# Patient Record
Sex: FEMALE | Race: WHITE | URBAN - METROPOLITAN AREA
[De-identification: names, ages, dates, MRNs, and addresses within clinical notes are randomized per-mention and may not be internally consistent; named-entity substitution may affect disease eponyms.]

---

## 2017-03-28 LAB
HPV COMMENT: NORMAL
HPV TYPE 16: NOT DETECTED
HPV TYPE 18: NOT DETECTED
HPVOH (OTHER TYPES): NOT DETECTED

## 2022-11-29 NOTE — PROGRESS NOTES
[facility]    CC: Breast CA s/p previous implant reconstruction    Referring physician: Natasha Guerra MD    HPI: This is a 47 y.o. female with a PMHx as delineated below who presents in consultation for breast reconstruction. She was found to have left breast cancer and underwent bilateral mastectomy with DTI reconstruction in 2008. She has not had any issues with her reconstruction since her surgery. She notes that she had some lymphedema that improved with compression. She notes that she has not had any changes with her implants, however was concerned about potential need for replacement of her implants. Given this, Plastic surgery was consulted for evaluation and treatment. The specifics of her breast cancer workup / treatment is as follows:     Diagnosis: invasive ductal &BRCA  Mo/Yr Diagnosed: 2008  Breast Involved:Left  Surgery: Bilateral mastectomy with direct to implant reconstruction (Aicha Brooks and Evan Cadena)  Date of Surgery: 2008  Tumor Size & rdGrdrrdarddrderd:rd rd3rd Henry status: Negative  ER: Unknown NV: Unknown HER2: Unknown    Oncologist: Dr. Emily Tadeo  Radiation: None    Chemo/Meds: Completed adjuvant chemotherapy  Pregnancy/Miscarriages: 2/1    PMHx:   Past Medical History:   Diagnosis Date    Breast cancer ([facility] [address]      PSHx:   Past Surgical History:   Procedure Laterality Date    BREAST BIOPSY      DILATION AND CURETTAGE OF UTERUS      LYMPH NODE BIOPSY      RASTA AND BSO      TUNNELED VENOUS PORT PLACEMENT       Allergies: Allergies   Allergen Reactions    Povidone     Soap      FHx: Past history of breast CA: Yes (mother, paternal mother, aunt)    Meds:   Current Outpatient Medications   Medication Sig Dispense Refill    cycloSPORINE (RESTASIS) 0.05 % ophthalmic emulsion 1 drop 2 times daily      cetirizine (ZYRTEC ALLERGY) 10 MG tablet Take 10 mg by mouth daily       No current facility-administered medications for this visit.      SocHx: Smoking: No    ETOH: occasional    Drug use: No    ROS   Constitutional: Negative for chills and fever. HENT: Negative for congestion, facial swelling, and voice change. Eyes: Negative for photophobia and visual disturbance. Respiratory: Negative for apnea, cough, chest tightness and shortness of breath. Cardiovascular: Negative for chest pain and palpitations. Gastrointestinal: Negative for dysphagia and early satiety. Genitourinary: Negative for difficulty urinating, dysuria, flank pain, frequency and hematuria. Musculoskeletal: Negative for new gait problem, joint swelling and myalgias. Skin: Negative for color change, pallor and rash. Endocrine: negative for tremors, temperature intolerance or polydipsia. Allergic/Immunologic: Negative for new environmental or food allergies. Neurological: Negative for dizziness, seizures, speech difficulty, numbness. Hematological: Negative for adenopathy. Psychiatric/Behavioral: Negative for agitation and confusion. EXAM  /77   Pulse 85   Temp 98.1 °F (36.7 °C) (Temporal)   Resp 17   Ht 5' 7\" (1.702 m)   Wt 156 lb 6.4 oz (70.9 kg)   SpO2 99%   BMI 24.50 kg/m²     GEN: NAD, pleasant, healthy  CVS: RRR  PULM: No respiratory distress  HEENT: PERRLA/EOMI; hearing appears within normal limits  NECK: Supple with trachea in midline, no masses  EXT: No lymphedema noted  ABD: soft/NT/ND   NEURO: No focal deficits, no obvious CN deficits  BACK: Bilateral latiss muscle intact  BREAST:   Physical Exam    Bra size: 36B  Desired bra size: Same size  Ptosis grade (reconstructed nipple without tattoo):   R: 1   L: 1  The left breast size is smaller than the right breast.  There were no palpable masses with no palpable lymphadenopathy in either axilla.    Nipple retraction: NA  Healed transverse mastectomies with some contour irregularities  Moderate animation deformity noted    Left breast sternal notch to nipple: 20 cm  Right breast sternal notch to nipple: 19.3 cm    Left breast nipple to inframammary fold: 5.5 cm  Right breast nipple to inframammary fold: 6.2 cm    Left breast width 15 cm  Right breast width 13 cm    IMAGING: PENDING    IMP: 47 y.o. female with breast cancer who presents for discussion regarding revision, reconstruction options  PLAN: Will obtain MRI for evaluation of her implant integrity. Will then return to discuss options including exchange within the submuscular pocket and fat grafting vs exchange to prepectoral space to eliminate her animation deformity and staged fat grafting. A discussion regarding surgical options including immediate vs delayed approaches, implant based vs autologous, as well as additional planned revisional surgeries was performed with the patient. Multiple autologous donor sites were discussed as well. Clinical photos were obtained. We additionally discussed the FDA recommendations regarding monitoring of silicone implants. The risks, benefits, alternatives, outcomes, personnel involved were discussed. Specifically, the risks including, but not limited to: bleeding that may necessitate transfusion or operation, infection, seroma, reoperation, nonhealing, poor cosmetic outcome, asymmetry, scarring, partial or total flap loss, donor site morbidity, VTE (DVT/PE), and death were discussed. All questions were answered in a satisfactory manner according to the patient.      Leila Paget, MD  [facility]  11/30/22

## 2022-11-30 ENCOUNTER — OFFICE VISIT (OUTPATIENT)
Dept: SURGERY | Age: 52
End: 2022-11-30
Payer: COMMERCIAL

## 2022-11-30 ENCOUNTER — TELEPHONE (OUTPATIENT)
Dept: SURGERY | Age: 52
End: 2022-11-30

## 2022-11-30 VITALS
WEIGHT: 156.4 LBS | HEART RATE: 85 BPM | BODY MASS INDEX: 24.55 KG/M2 | SYSTOLIC BLOOD PRESSURE: 132 MMHG | DIASTOLIC BLOOD PRESSURE: 77 MMHG | OXYGEN SATURATION: 99 % | TEMPERATURE: 98.1 F | RESPIRATION RATE: 17 BRPM | HEIGHT: 67 IN

## 2022-11-30 DIAGNOSIS — C50.912 MALIGNANT NEOPLASM OF LEFT FEMALE BREAST, UNSPECIFIED ESTROGEN RECEPTOR STATUS, UNSPECIFIED SITE OF BREAST (HCC): Primary | ICD-10-CM

## 2022-11-30 PROCEDURE — 99204 OFFICE O/P NEW MOD 45 MIN: CPT | Performed by: SURGERY

## 2022-11-30 NOTE — TELEPHONE ENCOUNTER
I lmom for the patient at the home number lsited. I advised that I will call her once I have a surgery letter and we can discuss what medical records we need if any. I will leave this phone note open.

## 2022-11-30 NOTE — TELEPHONE ENCOUNTER
Patient called to let the team know that she has been unable to track down records from her last mammogram.    She has an MRI scheduled for 12/19/2022, and that documentation is needed prior to that appointment. The patient is going to try to reach out to her OB-GYN (whom she's seen for years) to see if that record is still on file. She'll call us back with an update.

## 2022-12-01 NOTE — TELEPHONE ENCOUNTER
The patient is scheduled for an MRI 12-. I will not receive a surgery letter or be able to move forward with insurance pre certification until we receive the results. I will close this phone note.

## 2022-12-19 ENCOUNTER — HOSPITAL ENCOUNTER (OUTPATIENT)
Dept: MRI IMAGING | Age: 52
Discharge: HOME | End: 2022-12-19
Payer: COMMERCIAL

## 2022-12-19 DIAGNOSIS — C50.912 MALIGNANT NEOPLASM OF LEFT FEMALE BREAST, UNSPECIFIED ESTROGEN RECEPTOR STATUS, UNSPECIFIED SITE OF BREAST (HCC): ICD-10-CM

## 2022-12-19 PROCEDURE — 77047 MRI BREAST C- BILATERAL: CPT

## 2022-12-19 RX ORDER — SODIUM CHLORIDE 9 MG/ML
INJECTION, SOLUTION INTRAVENOUS ONCE
Status: DISCONTINUED | OUTPATIENT
Start: 2022-12-19 | End: 2022-12-20 | Stop reason: HOSPADM

## 2022-12-20 ENCOUNTER — TELEPHONE (OUTPATIENT)
Dept: SURGERY | Age: 52
End: 2022-12-20

## 2022-12-20 NOTE — TELEPHONE ENCOUNTER
----- Message from Darell Addison MD sent at 12/19/2022  8:56 PM EST -----  Limited exam, but no overt evidence of rupture. Will see her back in the office to discuss how she would like to proceed (eg transition to prepec or just exchange within the subpec space). Thanks!   NK

## 2022-12-20 NOTE — TELEPHONE ENCOUNTER
Called and left detailed message for patient. Requested patient call office and schedule a follow up with MD to discuss how she desires to move forward.

## 2023-01-09 ENCOUNTER — OFFICE VISIT (OUTPATIENT)
Dept: SURGERY | Age: 53
End: 2023-01-09
Payer: COMMERCIAL

## 2023-01-09 VITALS
HEIGHT: 67 IN | OXYGEN SATURATION: 99 % | WEIGHT: 160 LBS | SYSTOLIC BLOOD PRESSURE: 139 MMHG | TEMPERATURE: 97.2 F | HEART RATE: 86 BPM | BODY MASS INDEX: 25.11 KG/M2 | DIASTOLIC BLOOD PRESSURE: 80 MMHG

## 2023-01-09 DIAGNOSIS — C50.912 MALIGNANT NEOPLASM OF LEFT FEMALE BREAST, UNSPECIFIED ESTROGEN RECEPTOR STATUS, UNSPECIFIED SITE OF BREAST (HCC): Primary | ICD-10-CM

## 2023-01-09 PROCEDURE — 99214 OFFICE O/P EST MOD 30 MIN: CPT | Performed by: SURGERY

## 2023-01-09 NOTE — LETTER
Surgery Schedule Request Form  [facility]  [address] [place] [address]. [address]  DATE OF SURGERY: ***      TIME OF SURGERY: ***     Confirmation#:__________________        Surgeon Name: Jovana Barber MD    Phone: [phone]     Fax: [phone]  Procedure Name: 1) Exchange for permanent implants bilateral breasts             2) Bilateral breast capsulectomies     3) Bilateral breast fat grafting  CPT CODES (required for scheduling): [phone], 28747, 67 [phone] 65 [phone], [phone]  DIAGNOSIS:   Breast CA s/p implant reconstruction    LENGTH OF PROCEDURE: 2.5 hours  Patient Status: Outpatient    PATIENT NAME: Xiomara Zavala            YOB: 1970  SEX: female   SS #:   PHONE: [phone] (home) [phone] (work)    Labs Needed:   CBC ___  PT/PTT___ INR ____  CMP ___ EKG ___   Urine Hcg ___            Pre-Op to be done by: PCP  Cardiac Clearance Done by: per PCP    Pt Position:  supine  Patient to meet with Anesthesiology prior to surgery: no     Medications to be stopped 5 days before surgery: anticoagulation     **Ancef 2 gm IV OCTOR (NOTE:  If patient weight is > 120 kg, Administer 3 gm)  Other Orders: Transexemic acid 1g IV OCTOR; No Decadron; SA    INSURANCE: ***                                              SUBSCRIBER NAME: ***  MEMBER ID: ***                                               AUTHORIZATION #: ***    PCP: ***                                       ANESTHESIA:  General    Jovana Barber MD                             FAX TO: [phone]   QUESTIONS?  CALL: [phone]

## 2023-01-09 NOTE — PROGRESS NOTES
[facility]    CC: Breast CA s/p previous implant reconstruction    Referring physician: Juanita Goode MD    HPI: This is a 47 y.o. female with a PMHx as delineated below who presents in consultation for breast reconstruction. She was found to have left breast cancer and underwent bilateral mastectomy with DTI reconstruction in 2008. She has not had any issues with her reconstruction since her surgery. She notes that she had some lymphedema that improved with compression. She notes that she has not had any changes with her implants, however was concerned about potential need for replacement of her implants. Given this, Plastic surgery was consulted for evaluation and treatment. Since her last evaluation, she underwent MRI imaging and presents for discussion regarding options. The specifics of her breast cancer workup / treatment is as follows:     Diagnosis: invasive ductal &BRCA  Mo/Yr Diagnosed: 2008  Breast Involved:Left  Surgery: Bilateral mastectomy with direct to implant reconstruction (Aicha Braun and Jony Xiong)  Date of Surgery: 2008  Tumor Size & rdGrdrrdarddrderd:rd rd3rd Henry status: Negative  ER: Unknown OR: Unknown HER2: Unknown    Oncologist: Dr. Terrie Hannah  Radiation: None    Chemo/Meds: Completed adjuvant chemotherapy  Pregnancy/Miscarriages: 2/1    PMHx:   Past Medical History:   Diagnosis Date    Breast cancer ([facility] [address]      PSHx:   Past Surgical History:   Procedure Laterality Date    BREAST BIOPSY      DILATION AND CURETTAGE OF UTERUS      LYMPH NODE BIOPSY      RASTA AND BSO (CERVIX REMOVED)      TUNNELED VENOUS PORT PLACEMENT       Allergies: Allergies   Allergen Reactions    Betadine [Povidone-Iodine] Itching, Rash and Swelling     FHx: Past history of breast CA: Yes (mother, paternal mother, aunt)    Meds:   No current outpatient medications on file. No current facility-administered medications for this visit.      SocHx: Smoking: No    ETOH: occasional    Drug use: No    ROS Constitutional: Negative for chills and fever. HENT: Negative for congestion, facial swelling, and voice change. Eyes: Negative for photophobia and visual disturbance. Respiratory: Negative for apnea, cough, chest tightness and shortness of breath. Cardiovascular: Negative for chest pain and palpitations. Gastrointestinal: Negative for dysphagia and early satiety. Genitourinary: Negative for difficulty urinating, dysuria, flank pain, frequency and hematuria. Musculoskeletal: Negative for new gait problem, joint swelling and myalgias. Skin: Negative for color change, pallor and rash. Endocrine: negative for tremors, temperature intolerance or polydipsia. Allergic/Immunologic: Negative for new environmental or food allergies. Neurological: Negative for dizziness, seizures, speech difficulty, numbness. Hematological: Negative for adenopathy. Psychiatric/Behavioral: Negative for agitation and confusion. EXAM  /80   Pulse 86   Temp 97.2 °F (36.2 °C)   Ht 5' 7\" (1.702 m)   Wt 160 lb (72.6 kg)   SpO2 99%   BMI 25.06 kg/m²     GEN: NAD, pleasant, healthy  CVS: RRR  PULM: No respiratory distress  HEENT: PERRLA/EOMI; hearing appears within normal limits  NECK: Supple with trachea in midline, no masses  EXT: No lymphedema noted  ABD: soft/NT/ND   NEURO: No focal deficits, no obvious CN deficits  BACK: Bilateral latiss muscle intact  BREAST:   Physical Exam    Bra size: 36B  Desired bra size: Same size  Ptosis grade (reconstructed nipple without tattoo):   R: 1   L: 1  The left breast size is smaller than the right breast.  There were no palpable masses with no palpable lymphadenopathy in either axilla.    Nipple retraction: NA  Healed transverse mastectomies with some contour irregularities  Moderate animation deformity noted    Left breast sternal notch to nipple: 20 cm  Right breast sternal notch to nipple: 19.3 cm    Left breast nipple to inframammary fold: 5.5 cm  Right breast nipple to inframammary fold: 6.2 cm    Left breast width 15 cm  Right breast width 13 cm    IMAGING: MRI reviewed    IMP: 47 y.o. female with breast cancer who presents for discussion regarding revision, reconstruction options  PLAN: We discussed multiple options including exchange within the submuscular pocket and fat grafting vs exchange to prepectoral space to eliminate her animation deformity and staged fat grafting. She desires to proceed with the least invasive option,thus will plan for exchange within her current pocket and fat grafting. Will schedule when she returns from her trip in July. A discussion regarding surgical options including immediate vs delayed approaches, implant based vs autologous, as well as additional planned revisional surgeries was performed with the patient. Multiple autologous donor sites were discussed as well. Clinical photos were obtained. We additionally discussed the FDA recommendations regarding monitoring of silicone implants. The risks, benefits, alternatives, outcomes, personnel involved were discussed. Specifically, the risks including, but not limited to: bleeding that may necessitate transfusion or operation, infection, seroma, reoperation, nonhealing, poor cosmetic outcome, asymmetry, scarring, partial or total flap loss, donor site morbidity, VTE (DVT/PE), and death were discussed. All questions were answered in a satisfactory manner according to the patient.      Jayden Hernández MD  [address] [place] [address] [facility]  [phone]  01/09/23

## 2023-01-10 ENCOUNTER — TELEPHONE (OUTPATIENT)
Dept: SURGERY | Age: 53
End: 2023-01-10

## 2023-01-10 NOTE — TELEPHONE ENCOUNTER
The patient was in the office to see Dr. Aura Rodarte yesterday. PLAN: We discussed multiple options including exchange within the submuscular pocket and fat grafting vs exchange to prepectoral space to eliminate her animation deformity and staged fat grafting. She desires to proceed with the least invasive option,thus will plan for exchange within her current pocket and fat grafting. Will schedule when she returns from her trip in July. I received a surgery letter. I spoke with Kofi Arauz at [facility] [employer] [facility] ([phone]) to see if CPT Code [phone], [address] [place] [address] or [address] require pre certification. Pre authorization and notification are not required with the diagnosis code provided. Call Reference # [id number]    I lmom for the patient at the home number listed. I requested a call back to discuss insurance and surgery scheduling. I will leave this phone note open.

## 2023-01-11 NOTE — TELEPHONE ENCOUNTER
I returned the patients call mentioned below. I lmom for the patient at the home number listed. I requested a call back to discuss insurance and surgery scheduling. I will leave this phone note open.

## 2023-01-12 NOTE — TELEPHONE ENCOUNTER
I spoke with the patient at the home number listed. She is hopeful to schedule surgery in July. She is aware that I will reach out to her in April to schedule in case we have any schedule changes between now and then. I will leave this phone note open.

## 2023-03-02 NOTE — TELEPHONE ENCOUNTER
The patient returned my call mentioned below. The patient is now scheduled for surgery with  on 7-6-2023. The patient is aware of H&P. The patient is scheduled for her post op appointment 7-. I will submit the surgery letter today. I will mail the surgery information and instructions to the patient today. I will close this phone note.

## 2023-03-02 NOTE — TELEPHONE ENCOUNTER
I lmom for the patient at the home number listed. I requested a call back to discuss insurance and surgery scheduling. I will leave this phone note open.

## 2023-06-05 ENCOUNTER — TELEPHONE (OUTPATIENT)
Dept: SURGERY | Age: 53
End: 2023-06-05

## 2023-06-20 NOTE — TELEPHONE ENCOUNTER
I received a letter from [facility] dated 6-. I will scan the letter into Epic under the media tab. APPROVED  Authorization # [id number]  CPT Code [id number], [id number], [id number], [id number]  Date Range 7-6-2023 to 10-4-2023    I will close this phone note.

## 2023-06-30 RX ORDER — CETIRIZINE HYDROCHLORIDE 10 MG/1
TABLET ORAL
COMMUNITY

## 2023-07-05 ENCOUNTER — TELEPHONE (OUTPATIENT)
Dept: SURGERY | Age: 53
End: 2023-07-05

## 2023-07-05 ENCOUNTER — ANESTHESIA EVENT (OUTPATIENT)
Dept: OPERATING ROOM | Age: 53
End: 2023-07-05
Payer: COMMERCIAL

## 2023-07-05 NOTE — TELEPHONE ENCOUNTER
Spoke with patient and confirmed surgery tomorrow at [facility] with Dr. Judge Allen arrive at 0am for 730am surgery. Nothing to eat or drink after midnight and have a .

## 2023-07-06 ENCOUNTER — HOSPITAL ENCOUNTER (OUTPATIENT)
Age: 53
Setting detail: OUTPATIENT SURGERY
Discharge: HOME | End: 2023-07-06
Attending: SURGERY | Admitting: SURGERY
Payer: COMMERCIAL

## 2023-07-06 ENCOUNTER — TELEPHONE (OUTPATIENT)
Dept: SURGERY | Age: 53
End: 2023-07-06

## 2023-07-06 ENCOUNTER — ANESTHESIA (OUTPATIENT)
Dept: OPERATING ROOM | Age: 53
End: 2023-07-06
Payer: COMMERCIAL

## 2023-07-06 VITALS
OXYGEN SATURATION: 98 % | BODY MASS INDEX: 27 KG/M2 | WEIGHT: 168 LBS | RESPIRATION RATE: 16 BRPM | DIASTOLIC BLOOD PRESSURE: 86 MMHG | HEIGHT: 66 IN | HEART RATE: 75 BPM | TEMPERATURE: 96.3 F | SYSTOLIC BLOOD PRESSURE: 126 MMHG

## 2023-07-06 DIAGNOSIS — Z98.890 S/P BREAST RECONSTRUCTION: ICD-10-CM

## 2023-07-06 DIAGNOSIS — C50.911 BILATERAL MALIGNANT NEOPLASM OF BREAST IN FEMALE, UNSPECIFIED ESTROGEN RECEPTOR STATUS, UNSPECIFIED SITE OF BREAST (HCC): ICD-10-CM

## 2023-07-06 DIAGNOSIS — C50.912 BILATERAL MALIGNANT NEOPLASM OF BREAST IN FEMALE, UNSPECIFIED ESTROGEN RECEPTOR STATUS, UNSPECIFIED SITE OF BREAST (HCC): ICD-10-CM

## 2023-07-06 DIAGNOSIS — G89.18 POST-OP PAIN: Primary | ICD-10-CM

## 2023-07-06 PROCEDURE — 2500000003 HC RX 250 WO HCPCS: Performed by: SURGERY

## 2023-07-06 PROCEDURE — 6360000002 HC RX W HCPCS: Performed by: NURSE ANESTHETIST, CERTIFIED REGISTERED

## 2023-07-06 PROCEDURE — 7100000001 HC PACU RECOVERY - ADDTL 15 MIN: Performed by: SURGERY

## 2023-07-06 PROCEDURE — 15772 GRFG AUTOL FAT LIPO EA ADDL: CPT | Performed by: SURGERY

## 2023-07-06 PROCEDURE — 7100000010 HC PHASE II RECOVERY - FIRST 15 MIN: Performed by: SURGERY

## 2023-07-06 PROCEDURE — 88300 SURGICAL PATH GROSS: CPT

## 2023-07-06 PROCEDURE — 19371 PERI-IMPLT CAPSLC BRST COMPL: CPT | Performed by: SURGERY

## 2023-07-06 PROCEDURE — 7100000011 HC PHASE II RECOVERY - ADDTL 15 MIN: Performed by: SURGERY

## 2023-07-06 PROCEDURE — 2720000010 HC SURG SUPPLY STERILE: Performed by: SURGERY

## 2023-07-06 PROCEDURE — C1789 PROSTHESIS, BREAST, IMP: HCPCS | Performed by: SURGERY

## 2023-07-06 PROCEDURE — 6360000002 HC RX W HCPCS: Performed by: SURGERY

## 2023-07-06 PROCEDURE — 3600000014 HC SURGERY LEVEL 4 ADDTL 15MIN: Performed by: SURGERY

## 2023-07-06 PROCEDURE — 7100000000 HC PACU RECOVERY - FIRST 15 MIN: Performed by: SURGERY

## 2023-07-06 PROCEDURE — 2580000003 HC RX 258: Performed by: SURGERY

## 2023-07-06 PROCEDURE — 6360000002 HC RX W HCPCS: Performed by: ANESTHESIOLOGY

## 2023-07-06 PROCEDURE — 6370000000 HC RX 637 (ALT 250 FOR IP): Performed by: ANESTHESIOLOGY

## 2023-07-06 PROCEDURE — 2500000003 HC RX 250 WO HCPCS: Performed by: NURSE ANESTHETIST, CERTIFIED REGISTERED

## 2023-07-06 PROCEDURE — 3600000004 HC SURGERY LEVEL 4 BASE: Performed by: SURGERY

## 2023-07-06 PROCEDURE — A4217 STERILE WATER/SALINE, 500 ML: HCPCS | Performed by: SURGERY

## 2023-07-06 PROCEDURE — 19342 INSJ/RPLCMT BRST IMPLT SEP D: CPT | Performed by: SURGERY

## 2023-07-06 PROCEDURE — 3700000000 HC ANESTHESIA ATTENDED CARE: Performed by: SURGERY

## 2023-07-06 PROCEDURE — 3700000001 HC ADD 15 MINUTES (ANESTHESIA): Performed by: SURGERY

## 2023-07-06 PROCEDURE — 2580000003 HC RX 258: Performed by: NURSE ANESTHETIST, CERTIFIED REGISTERED

## 2023-07-06 PROCEDURE — 88304 TISSUE EXAM BY PATHOLOGIST: CPT

## 2023-07-06 PROCEDURE — 15771 GRFG AUTOL FAT LIPO 50 CC/<: CPT | Performed by: SURGERY

## 2023-07-06 PROCEDURE — 2709999900 HC NON-CHARGEABLE SUPPLY: Performed by: SURGERY

## 2023-07-06 DEVICE — IMPLANTABLE DEVICE: Type: IMPLANTABLE DEVICE | Site: BREAST | Status: FUNCTIONAL

## 2023-07-06 DEVICE — IMPL BREAST GEL BOOST SMTH HI PROF 440CC: Type: IMPLANTABLE DEVICE | Site: BREAST | Status: FUNCTIONAL

## 2023-07-06 RX ORDER — LIDOCAINE HYDROCHLORIDE 20 MG/ML
INJECTION, SOLUTION INTRAVENOUS PRN
Status: DISCONTINUED | OUTPATIENT
Start: 2023-07-06 | End: 2023-07-06 | Stop reason: SDUPTHER

## 2023-07-06 RX ORDER — HYDROMORPHONE HYDROCHLORIDE 1 MG/ML
0.5 INJECTION, SOLUTION INTRAMUSCULAR; INTRAVENOUS; SUBCUTANEOUS EVERY 5 MIN PRN
Status: DISCONTINUED | OUTPATIENT
Start: 2023-07-06 | End: 2023-07-06 | Stop reason: HOSPADM

## 2023-07-06 RX ORDER — PROPOFOL 10 MG/ML
INJECTION, EMULSION INTRAVENOUS PRN
Status: DISCONTINUED | OUTPATIENT
Start: 2023-07-06 | End: 2023-07-06 | Stop reason: SDUPTHER

## 2023-07-06 RX ORDER — PROCHLORPERAZINE EDISYLATE 5 MG/ML
5 INJECTION INTRAMUSCULAR; INTRAVENOUS
Status: COMPLETED | OUTPATIENT
Start: 2023-07-06 | End: 2023-07-06

## 2023-07-06 RX ORDER — SODIUM CHLORIDE, SODIUM LACTATE, POTASSIUM CHLORIDE, CALCIUM CHLORIDE 600; 310; 30; 20 MG/100ML; MG/100ML; MG/100ML; MG/100ML
INJECTION, SOLUTION INTRAVENOUS CONTINUOUS PRN
Status: DISCONTINUED | OUTPATIENT
Start: 2023-07-06 | End: 2023-07-06 | Stop reason: SDUPTHER

## 2023-07-06 RX ORDER — SODIUM CHLORIDE, SODIUM LACTATE, POTASSIUM CHLORIDE, CALCIUM CHLORIDE 600; 310; 30; 20 MG/100ML; MG/100ML; MG/100ML; MG/100ML
INJECTION, SOLUTION INTRAVENOUS CONTINUOUS
Status: DISCONTINUED | OUTPATIENT
Start: 2023-07-06 | End: 2023-07-06 | Stop reason: HOSPADM

## 2023-07-06 RX ORDER — ROCURONIUM BROMIDE 10 MG/ML
INJECTION, SOLUTION INTRAVENOUS PRN
Status: DISCONTINUED | OUTPATIENT
Start: 2023-07-06 | End: 2023-07-06 | Stop reason: SDUPTHER

## 2023-07-06 RX ORDER — FAMOTIDINE 10 MG/ML
INJECTION, SOLUTION INTRAVENOUS PRN
Status: DISCONTINUED | OUTPATIENT
Start: 2023-07-06 | End: 2023-07-06 | Stop reason: SDUPTHER

## 2023-07-06 RX ORDER — LIDOCAINE HYDROCHLORIDE 10 MG/ML
INJECTION, SOLUTION INFILTRATION; PERINEURAL PRN
Status: DISCONTINUED | OUTPATIENT
Start: 2023-07-06 | End: 2023-07-06 | Stop reason: HOSPADM

## 2023-07-06 RX ORDER — HYDRALAZINE HYDROCHLORIDE 20 MG/ML
10 INJECTION INTRAMUSCULAR; INTRAVENOUS
Status: DISCONTINUED | OUTPATIENT
Start: 2023-07-06 | End: 2023-07-06 | Stop reason: HOSPADM

## 2023-07-06 RX ORDER — FENTANYL CITRATE 50 UG/ML
INJECTION, SOLUTION INTRAMUSCULAR; INTRAVENOUS PRN
Status: DISCONTINUED | OUTPATIENT
Start: 2023-07-06 | End: 2023-07-06 | Stop reason: SDUPTHER

## 2023-07-06 RX ORDER — ONDANSETRON 2 MG/ML
4 INJECTION INTRAMUSCULAR; INTRAVENOUS
Status: COMPLETED | OUTPATIENT
Start: 2023-07-06 | End: 2023-07-06

## 2023-07-06 RX ORDER — ONDANSETRON 4 MG/1
4 TABLET, ORALLY DISINTEGRATING ORAL 3 TIMES DAILY PRN
Qty: 21 TABLET | Refills: 0 | Status: SHIPPED | OUTPATIENT
Start: 2023-07-06 | End: 2023-08-07

## 2023-07-06 RX ORDER — SODIUM CHLORIDE 9 MG/ML
INJECTION, SOLUTION INTRAVENOUS PRN
Status: DISCONTINUED | OUTPATIENT
Start: 2023-07-06 | End: 2023-07-06 | Stop reason: HOSPADM

## 2023-07-06 RX ORDER — CEPHALEXIN 500 MG/1
500 CAPSULE ORAL 4 TIMES DAILY
Qty: 40 CAPSULE | Refills: 0 | Status: SHIPPED | OUTPATIENT
Start: 2023-07-06 | End: 2023-07-16

## 2023-07-06 RX ORDER — EPINEPHRINE 1 MG/ML
INJECTION, SOLUTION, CONCENTRATE INTRAVENOUS PRN
Status: DISCONTINUED | OUTPATIENT
Start: 2023-07-06 | End: 2023-07-06 | Stop reason: HOSPADM

## 2023-07-06 RX ORDER — MIDAZOLAM HYDROCHLORIDE 1 MG/ML
INJECTION INTRAMUSCULAR; INTRAVENOUS PRN
Status: DISCONTINUED | OUTPATIENT
Start: 2023-07-06 | End: 2023-07-06 | Stop reason: SDUPTHER

## 2023-07-06 RX ORDER — ONDANSETRON 2 MG/ML
INJECTION INTRAMUSCULAR; INTRAVENOUS PRN
Status: DISCONTINUED | OUTPATIENT
Start: 2023-07-06 | End: 2023-07-06 | Stop reason: SDUPTHER

## 2023-07-06 RX ORDER — OXYCODONE HYDROCHLORIDE 5 MG/1
5 TABLET ORAL EVERY 6 HOURS PRN
Qty: 12 TABLET | Refills: 0 | Status: SHIPPED | OUTPATIENT
Start: 2023-07-06 | End: 2023-07-09

## 2023-07-06 RX ORDER — SODIUM CHLORIDE 0.9 % (FLUSH) 0.9 %
5-40 SYRINGE (ML) INJECTION PRN
Status: DISCONTINUED | OUTPATIENT
Start: 2023-07-06 | End: 2023-07-06 | Stop reason: HOSPADM

## 2023-07-06 RX ORDER — MEPERIDINE HYDROCHLORIDE 25 MG/ML
12.5 INJECTION INTRAMUSCULAR; INTRAVENOUS; SUBCUTANEOUS EVERY 5 MIN PRN
Status: DISCONTINUED | OUTPATIENT
Start: 2023-07-06 | End: 2023-07-06 | Stop reason: HOSPADM

## 2023-07-06 RX ORDER — HYDROMORPHONE HYDROCHLORIDE 1 MG/ML
0.5 INJECTION, SOLUTION INTRAMUSCULAR; INTRAVENOUS; SUBCUTANEOUS EVERY 5 MIN PRN
Status: COMPLETED | OUTPATIENT
Start: 2023-07-06 | End: 2023-07-06

## 2023-07-06 RX ORDER — OXYCODONE HYDROCHLORIDE 5 MG/1
5 TABLET ORAL
Status: COMPLETED | OUTPATIENT
Start: 2023-07-06 | End: 2023-07-06

## 2023-07-06 RX ORDER — LABETALOL HYDROCHLORIDE 5 MG/ML
10 INJECTION, SOLUTION INTRAVENOUS
Status: DISCONTINUED | OUTPATIENT
Start: 2023-07-06 | End: 2023-07-06 | Stop reason: HOSPADM

## 2023-07-06 RX ORDER — SODIUM CHLORIDE 0.9 % (FLUSH) 0.9 %
5-40 SYRINGE (ML) INJECTION EVERY 12 HOURS SCHEDULED
Status: DISCONTINUED | OUTPATIENT
Start: 2023-07-06 | End: 2023-07-06 | Stop reason: HOSPADM

## 2023-07-06 RX ADMIN — CEFAZOLIN 2000 MG: 2 INJECTION, POWDER, FOR SOLUTION INTRAMUSCULAR; INTRAVENOUS at 07:28

## 2023-07-06 RX ADMIN — OXYCODONE HYDROCHLORIDE 5 MG: 5 TABLET ORAL at 09:52

## 2023-07-06 RX ADMIN — LIDOCAINE HYDROCHLORIDE 100 MG: 20 INJECTION, SOLUTION INTRAVENOUS at 07:32

## 2023-07-06 RX ADMIN — TRANEXAMIC ACID 1000 MG: 100 INJECTION, SOLUTION INTRAVENOUS at 07:50

## 2023-07-06 RX ADMIN — FAMOTIDINE 20 MG: 10 INJECTION, SOLUTION INTRAVENOUS at 07:39

## 2023-07-06 RX ADMIN — ONDANSETRON 4 MG: 2 INJECTION INTRAMUSCULAR; INTRAVENOUS at 09:57

## 2023-07-06 RX ADMIN — SUGAMMADEX 200 MG: 100 INJECTION, SOLUTION INTRAVENOUS at 09:04

## 2023-07-06 RX ADMIN — ONDANSETRON 4 MG: 2 INJECTION INTRAMUSCULAR; INTRAVENOUS at 09:04

## 2023-07-06 RX ADMIN — PROPOFOL 80 MG: 10 INJECTION, EMULSION INTRAVENOUS at 07:32

## 2023-07-06 RX ADMIN — ONDANSETRON 4 MG: 2 INJECTION INTRAMUSCULAR; INTRAVENOUS at 07:39

## 2023-07-06 RX ADMIN — SODIUM CHLORIDE, SODIUM LACTATE, POTASSIUM CHLORIDE, AND CALCIUM CHLORIDE: .6; .31; .03; .02 INJECTION, SOLUTION INTRAVENOUS at 07:14

## 2023-07-06 RX ADMIN — PROPOFOL 50 MG: 10 INJECTION, EMULSION INTRAVENOUS at 07:37

## 2023-07-06 RX ADMIN — PROCHLORPERAZINE EDISYLATE 5 MG: 5 INJECTION, SOLUTION INTRAMUSCULAR; INTRAVENOUS at 11:08

## 2023-07-06 RX ADMIN — FENTANYL CITRATE 100 MCG: 50 INJECTION, SOLUTION INTRAMUSCULAR; INTRAVENOUS at 08:22

## 2023-07-06 RX ADMIN — FENTANYL CITRATE 100 MCG: 50 INJECTION, SOLUTION INTRAMUSCULAR; INTRAVENOUS at 07:32

## 2023-07-06 RX ADMIN — HYDROMORPHONE HYDROCHLORIDE 0.5 MG: 1 INJECTION, SOLUTION INTRAMUSCULAR; INTRAVENOUS; SUBCUTANEOUS at 09:52

## 2023-07-06 RX ADMIN — PROPOFOL 50 MG: 10 INJECTION, EMULSION INTRAVENOUS at 07:43

## 2023-07-06 RX ADMIN — HYDROMORPHONE HYDROCHLORIDE 0.5 MG: 1 INJECTION, SOLUTION INTRAMUSCULAR; INTRAVENOUS; SUBCUTANEOUS at 10:00

## 2023-07-06 RX ADMIN — MIDAZOLAM HYDROCHLORIDE 2 MG: 2 INJECTION, SOLUTION INTRAMUSCULAR; INTRAVENOUS at 07:28

## 2023-07-06 RX ADMIN — ROCURONIUM BROMIDE 100 MG: 10 INJECTION INTRAVENOUS at 07:32

## 2023-07-06 ASSESSMENT — PAIN DESCRIPTION - LOCATION
LOCATION: ABDOMEN
LOCATION: BREAST;ABDOMEN

## 2023-07-06 ASSESSMENT — PAIN DESCRIPTION - DESCRIPTORS
DESCRIPTORS: DISCOMFORT
DESCRIPTORS: DISCOMFORT

## 2023-07-06 ASSESSMENT — PAIN SCALES - GENERAL
PAINLEVEL_OUTOF10: 2
PAINLEVEL_OUTOF10: 6
PAINLEVEL_OUTOF10: 0
PAINLEVEL_OUTOF10: 2
PAINLEVEL_OUTOF10: 6

## 2023-07-06 ASSESSMENT — PAIN - FUNCTIONAL ASSESSMENT
PAIN_FUNCTIONAL_ASSESSMENT: 0-10
PAIN_FUNCTIONAL_ASSESSMENT: ACTIVITIES ARE NOT PREVENTED
PAIN_FUNCTIONAL_ASSESSMENT: ACTIVITIES ARE NOT PREVENTED

## 2023-07-06 ASSESSMENT — PAIN DESCRIPTION - ONSET: ONSET: ON-GOING

## 2023-07-06 ASSESSMENT — PAIN DESCRIPTION - ORIENTATION: ORIENTATION: RIGHT;LEFT

## 2023-07-06 ASSESSMENT — PAIN DESCRIPTION - FREQUENCY: FREQUENCY: CONTINUOUS

## 2023-07-06 ASSESSMENT — PAIN DESCRIPTION - PAIN TYPE: TYPE: SURGICAL PAIN

## 2023-07-06 NOTE — PROGRESS NOTES
Pt received from OR to PACU # 7 via stretcher. Post:  EXCHANGE FOR PERMANENT IMPLANTS BILATERAL BREASTS, LEFT BREAST CAPSULECTOMY, BILATERAL BREAST FAT GRAFTING - Bilateral     Report received from OR RNSHANNAN CRNA and Dr. Kevon Nash. Per report pt did well. Respirations reg and easy. Pt is alert. Attached to PACU monitoring system. Alarms and parameters set    Pain denies and no complaints of nausea.

## 2023-07-06 NOTE — PROGRESS NOTES
Pt unable to remember which side her breast cancer was on. Her H&P stated Left breast cancer--IV placed to Right arm.

## 2023-07-06 NOTE — BRIEF OP NOTE
Brief Postoperative Note      Patient: Toshia Thayer  YOB: 1970  MRN: [id number]    Date of Procedure: 7/6/2023    Pre-Op Diagnosis Codes:     * Bilateral malignant neoplasm of breast in female, unspecified estrogen receptor status, unspecified site of breast ([address]) [C50.911, C50.912]     * S/P breast reconstruction [Z98.890]    Post-Op Diagnosis: Same       Procedure(s):  EXCHANGE FOR PERMANENT IMPLANTS BILATERAL BREASTS, LEFT BREAST CAPSULECTOMY, BILATERAL BREAST FAT GRAFTING  . Surgeon(s):  Ivonne Shafer MD    Assistant:  First Assistant: Kacey Roger    Anesthesia: General    Estimated Blood Loss (mL): Minimal    Complications: None    Specimens:   ID Type Source Tests Collected by Time Destination   A : left breast implant Tissue Breast SURGICAL PATHOLOGY Ivonne Shafer MD 7/6/2023 0804    B : right breast implant Tissue Breast SURGICAL PATHOLOGY Ivonne Shafer MD 7/6/2023 0806    C : LEFT BREAST CAPSULE Tissue Breast SURGICAL PATHOLOGY Ivonne Shafer MD 7/6/2023 7473        Implants:  * No implants in log *      Drains: * No LDAs found *    Findings:   Bilateral exchange of implants. Bilateral fat grafting.       Electronically signed by Germán Mccullough DO on 7/6/2023 at 9:12 AM

## 2023-07-06 NOTE — TELEPHONE ENCOUNTER
Patient called in requesting a prescription for something that will help her nausea     Please contact the patient at [phone]

## 2023-07-06 NOTE — ANESTHESIA POSTPROCEDURE EVALUATION
Department of Anesthesiology  Postprocedure Note    Patient: Carlos Manuel Berumen  MRN: [id number]  YOB: 1970  Date of evaluation: 7/6/2023      Procedure Summary     Date: 07/06/23 Room / Location: [place] [address] / The [address] [facility]    Anesthesia Start: 5882 Anesthesia Stop: 1003    Procedures:       EXCHANGE FOR PERMANENT IMPLANTS BILATERAL BREASTS, LEFT BREAST CAPSULECTOMY, BILATERAL BREAST FAT GRAFTING (Bilateral: Breast)      . Diagnosis:       Bilateral malignant neoplasm of breast in female, unspecified estrogen receptor status, unspecified site of breast ([address])      S/P breast reconstruction      (Bilateral malignant neoplasm of breast in female, unspecified estrogen receptor status, unspecified site of breast ([address]) [C50.911, C50.912])      (S/P breast reconstruction [H14.769])    Surgeons: Andres Steele MD Responsible Provider: Irene Alonso MD    Anesthesia Type: general ASA Status: 1          Anesthesia Type: No value filed.     Larry Phase I: Larry Score: 10    Larry Phase II:        Anesthesia Post Evaluation    Patient location during evaluation: PACU  Patient participation: complete - patient participated  Level of consciousness: awake and alert  Airway patency: patent  Nausea & Vomiting: no nausea and no vomiting  Complications: no  Cardiovascular status: hemodynamically stable  Respiratory status: acceptable  Hydration status: euvolemic  Multimodal analgesia pain management approach

## 2023-07-06 NOTE — OP NOTE
best fit. Once the appropriate size was selected, attention was directed to the contralateral left breast and the same procedure performed. An inframammary incision was performed. The tissue dissected down to the capsule, which was incised allowing entry to the pocket. The implant - same as the contralateral side - was removed. Fairly extensive capsulotomies were required with capsulectomy for improvement in the lower contour for better symmetry. Upon completion, sizers were placed to best match symmetry. Attention was then directed back to the abdomen and liposuction was performed using a Revolve system. A total of 180 cc of lipoaspirate was utilized for fat grafting to the bilateral breasts. After the injection was completed with sizers in place, the sites were then closed using 5-0 Fast Gut. The patient was then sat back down and the pockets were prepared for implants. Beginning with the right breast, the pocket was copiously irrigated with dilute Betadine followed by triple antibiotic solution. The implants were brought to the field and then placed in triple antibiotic solution. Using a no-touch technique with the Maurice funnel, the implants were placed into the pockets. The tissue was closed in layers using 3-0 Monocryl followed by a sterile dressing. The patient was then awakened, extubated, and taken to the PACU in stable condition. At the end of the case, all counts were correct and there were no immediate complications. The patient tolerated the procedure well. DRAINS: None    SPECIMEN: [place], smooth, round implants, left breast capsule    CONDITION: Stable    IMPLANTS:   (Right) 440 cc [place] MemoryGel, Smooth, Round, High Profile BOOST Breast Implants, reference# [id number], lot# [id number]. (Left)   405 cc [place] MemoryGel, Smooth, Round, High Profile BOOST Breast Implants, reference# [id number], lot# [id number].     Bhavani Prather MD

## 2023-07-07 ENCOUNTER — TELEPHONE (OUTPATIENT)
Dept: SURGERY | Age: 53
End: 2023-07-07

## 2023-07-07 NOTE — TELEPHONE ENCOUNTER
I spoke with the patient at the home number listed to advise that [facility] sent in a prescription in for Zofran. I will close this phone note.

## 2023-07-07 NOTE — TELEPHONE ENCOUNTER
Patient says she has not had a BM since her surgery yesterday. Can the team recommend anything to assist with this?     Please call back at [phone]

## 2023-07-07 NOTE — TELEPHONE ENCOUNTER
Spoke with patient explained this is common after surgery with the sandy and pain medication to just give it a few days to get out of her system and to drink some water and if nothing by Monday or Tuesday next week let us know and we will give her some recommendations

## 2023-07-13 ENCOUNTER — OFFICE VISIT (OUTPATIENT)
Dept: SURGERY | Age: 53
End: 2023-07-13

## 2023-07-13 VITALS
SYSTOLIC BLOOD PRESSURE: 144 MMHG | DIASTOLIC BLOOD PRESSURE: 84 MMHG | BODY MASS INDEX: 27.12 KG/M2 | WEIGHT: 168 LBS | HEART RATE: 75 BPM | OXYGEN SATURATION: 98 % | TEMPERATURE: 98.5 F

## 2023-07-13 DIAGNOSIS — Z09 POSTOP CHECK: Primary | ICD-10-CM

## 2023-07-13 PROCEDURE — 99024 POSTOP FOLLOW-UP VISIT: CPT

## 2023-07-17 ENCOUNTER — TELEPHONE (OUTPATIENT)
Dept: SURGERY | Age: 53
End: 2023-07-17

## 2023-07-17 NOTE — TELEPHONE ENCOUNTER
Spoke with patient and answered all questions. ..no sleeping with arms above head and no lotions oils on skin along with no rubbing or scrubbing of chest

## 2023-07-17 NOTE — TELEPHONE ENCOUNTER
Patient called asking if she able to sleep with her arms above her head?  Also can she put any lotion or oil on her stomach and breast?    Please call: [phone]

## 2023-08-07 ENCOUNTER — OFFICE VISIT (OUTPATIENT)
Dept: SURGERY | Age: 53
End: 2023-08-07

## 2023-08-07 VITALS
HEART RATE: 79 BPM | DIASTOLIC BLOOD PRESSURE: 86 MMHG | BODY MASS INDEX: 26.2 KG/M2 | TEMPERATURE: 98 F | RESPIRATION RATE: 16 BRPM | OXYGEN SATURATION: 98 % | WEIGHT: 163 LBS | SYSTOLIC BLOOD PRESSURE: 129 MMHG | HEIGHT: 66 IN

## 2023-08-07 DIAGNOSIS — Z09 POSTOP CHECK: Primary | ICD-10-CM

## 2023-08-07 PROCEDURE — 99024 POSTOP FOLLOW-UP VISIT: CPT | Performed by: SURGERY

## 2023-08-14 ENCOUNTER — OFFICE VISIT (OUTPATIENT)
Dept: SURGERY | Age: 53
End: 2023-08-14

## 2023-08-14 VITALS
SYSTOLIC BLOOD PRESSURE: 151 MMHG | WEIGHT: 163.6 LBS | OXYGEN SATURATION: 98 % | RESPIRATION RATE: 19 BRPM | DIASTOLIC BLOOD PRESSURE: 87 MMHG | BODY MASS INDEX: 26.29 KG/M2 | HEART RATE: 75 BPM | HEIGHT: 66 IN | TEMPERATURE: 97.7 F

## 2023-08-14 DIAGNOSIS — Z09 POSTOP CHECK: Primary | ICD-10-CM

## 2023-08-14 PROCEDURE — 99024 POSTOP FOLLOW-UP VISIT: CPT | Performed by: SURGERY

## 2023-08-15 ENCOUNTER — TELEPHONE (OUTPATIENT)
Dept: SURGERY | Age: 53
End: 2023-08-15

## 2023-08-15 NOTE — TELEPHONE ENCOUNTER
The patient was in the office to see Dr. Malou Askew yesterday. PLAN: Will plan for debridement and closure of the left breast with vac application    I received a surgery letter. According to the [facility] Provider Portal 8- 9:24 am      APPROVED  Authorization # [id number]  CPT Code 01659, [id number]    I received a print out and will scan it into Epic under the media tab. I spoke with the patient at the home number listed. The patient is now scheduled for surgery with  on 9-7-2023. The patient is aware of H&P. The patient is scheduled for her post op appointment 9-. I will submit surgery letter to [address] [place] [address],[address] today. I will email the surgery information and instructions to the patient today at [email or web address]. I will close this phone note.

## 2023-08-31 ENCOUNTER — OFFICE VISIT (OUTPATIENT)
Dept: SURGERY | Age: 53
End: 2023-08-31

## 2023-08-31 VITALS
RESPIRATION RATE: 19 BRPM | BODY MASS INDEX: 26.2 KG/M2 | DIASTOLIC BLOOD PRESSURE: 75 MMHG | SYSTOLIC BLOOD PRESSURE: 146 MMHG | WEIGHT: 163 LBS | TEMPERATURE: 98.2 F | HEART RATE: 87 BPM | HEIGHT: 66 IN | OXYGEN SATURATION: 98 %

## 2023-08-31 DIAGNOSIS — Z09 POSTOP CHECK: Primary | ICD-10-CM

## 2023-08-31 PROCEDURE — 99024 POSTOP FOLLOW-UP VISIT: CPT

## 2023-09-01 RX ORDER — CEPHALEXIN 500 MG/1
500 CAPSULE ORAL 2 TIMES DAILY
Qty: 14 CAPSULE | Refills: 0 | Status: SHIPPED | OUTPATIENT
Start: 2023-09-01 | End: 2023-09-08

## 2023-09-15 ENCOUNTER — OFFICE VISIT (OUTPATIENT)
Dept: SURGERY | Age: 53
End: 2023-09-15

## 2023-09-15 VITALS
HEART RATE: 85 BPM | DIASTOLIC BLOOD PRESSURE: 74 MMHG | BODY MASS INDEX: 26.31 KG/M2 | OXYGEN SATURATION: 98 % | TEMPERATURE: 98.1 F | WEIGHT: 163 LBS | SYSTOLIC BLOOD PRESSURE: 139 MMHG

## 2023-09-15 DIAGNOSIS — R22.2 MASS IN CHEST: Primary | ICD-10-CM

## 2023-09-15 PROCEDURE — 99024 POSTOP FOLLOW-UP VISIT: CPT

## 2023-09-19 ENCOUNTER — TELEPHONE (OUTPATIENT)
Dept: SURGERY | Age: 53
End: 2023-09-19

## 2023-09-19 ENCOUNTER — HOSPITAL ENCOUNTER (OUTPATIENT)
Dept: ULTRASOUND IMAGING | Age: 53
Discharge: HOME | End: 2023-09-19
Payer: COMMERCIAL

## 2023-09-19 DIAGNOSIS — R22.2 MASS IN CHEST: ICD-10-CM

## 2023-09-19 PROCEDURE — 76642 ULTRASOUND BREAST LIMITED: CPT

## 2023-09-19 PROCEDURE — 76641 ULTRASOUND BREAST COMPLETE: CPT

## 2023-09-19 NOTE — TELEPHONE ENCOUNTER
----- Message from GUILLERMINA Ozuna CNP sent at 9/19/2023 10:10 AM EDT -----  Hi guys,   The breast ultrasound shows the \"lumps\" you felt are most likely fat necrosis from the recent fat grafting. We will give it time and see how things heal. Keep an eye on the scab and keep us posted.    Shannon Reina   ----- Message -----  From: [place] Incoming Orders Results To [facility]  Sent: 9/19/2023  10:08 AM EDT  To: GUILLERMINA Ozuna CNP

## 2023-10-23 ENCOUNTER — OFFICE VISIT (OUTPATIENT)
Dept: SURGERY | Age: 53
End: 2023-10-23

## 2023-10-23 VITALS
WEIGHT: 159 LBS | HEART RATE: 70 BPM | OXYGEN SATURATION: 98 % | BODY MASS INDEX: 25.66 KG/M2 | DIASTOLIC BLOOD PRESSURE: 80 MMHG | SYSTOLIC BLOOD PRESSURE: 136 MMHG | RESPIRATION RATE: 16 BRPM | TEMPERATURE: 97.8 F

## 2023-10-23 DIAGNOSIS — Z09 POSTOP CHECK: Primary | ICD-10-CM

## 2023-10-23 PROCEDURE — 99024 POSTOP FOLLOW-UP VISIT: CPT | Performed by: SURGERY

## 2023-10-23 NOTE — PROGRESS NOTES
[facility] PLASTIC & RECONSTRUCTIVE SURGERY    PROCEDURE: IBR  DATE: 7/6/23    Ganesh Shipman has been recovering well since her procedure. Pain has been well controlled without pain medications. She notes that her left breast area has healed well. PMHx:   Past Medical History:   Diagnosis Date    Breast cancer ([address])     Wears glasses      PSHx:   Past Surgical History:   Procedure Laterality Date    BREAST BIOPSY      BREAST SURGERY Bilateral 07/06/2023    EXCHANGE FOR PERMANENT IMPLANTS BILATERAL BREASTS, LEFT BREAST CAPSULECTOMY, BILATERAL BREAST FAT GRAFTING performed by Krista Flores MD at [facility]      FAT TRANSFER N/A 07/06/2023    . performed by Krista Flores MD at [address] [place] [address]  01/14/2009    bilateral ovaries and tobes removed.     TUNNELED VENOUS PORT PLACEMENT       Allergy:   Allergies   Allergen Reactions    Betadine [Povidone-Iodine] Itching, Rash and Swelling       SHx:   Social History     Socioeconomic History    Marital status:      Spouse name: Not on file    Number of children: Not on file    Years of education: Not on file    Highest education level: Not on file   Occupational History    Not on file   Tobacco Use    Smoking status: Never     Passive exposure: Never    Smokeless tobacco: Never   Vaping Use    Vaping Use: Never used   Substance and Sexual Activity    Alcohol use: Yes     Comment: RARE    Drug use: Never    Sexual activity: Never   Other Topics Concern    Not on file   Social History Narrative    Not on file     Social Determinants of Health     Financial Resource Strain: Not on file   Food Insecurity: Not on file   Transportation Needs: Not on file   Physical Activity: Not on file   Stress: Not on file   Social Connections: Not on file   Intimate Partner Violence: Not on file   Housing Stability: Not on file     FHx: Multiple members with breast CA    Meds:   Current Outpatient Medications   Medication

## 2024-12-16 ENCOUNTER — OFFICE VISIT (OUTPATIENT)
Dept: SURGERY | Age: 54
End: 2024-12-16

## 2024-12-16 VITALS
HEART RATE: 70 BPM | OXYGEN SATURATION: 98 % | BODY MASS INDEX: 24.53 KG/M2 | SYSTOLIC BLOOD PRESSURE: 116 MMHG | WEIGHT: 152 LBS | TEMPERATURE: 97.7 F | DIASTOLIC BLOOD PRESSURE: 71 MMHG

## 2024-12-16 DIAGNOSIS — Z09 POSTOP CHECK: Primary | ICD-10-CM

## 2024-12-16 PROCEDURE — 99213 OFFICE O/P EST LOW 20 MIN: CPT | Performed by: SURGERY

## 2024-12-16 NOTE — PROGRESS NOTES
[place]    Meds:   Current Outpatient Medications   Medication Sig Dispense Refill    vitamin D3 (CHOLECALCIFEROL) 125 MCG (5000 UT) TABS tablet Take 1 tablet every day by oral route.       No current facility-administered medications for this visit.       ROS   Constitutional: Negative for chills and fever.   HENT: Negative for congestion, facial swelling, and voice change.    Eyes: Negative for photophobia and visual disturbance.   Respiratory: Negative for apnea, cough, chest tightness and shortness of breath.    Cardiovascular: Negative for chest pain and palpitations.   Gastrointestinal: Negative for dysphagia and early satiety.  Genitourinary: Negative for difficulty urinating, dysuria, flank pain, frequency and hematuria.   Musculoskeletal: Negative for new gait problem, joint swelling and myalgias.   Skin: Negative for color change, pallor and rash.   Endocrine: negative for tremors, temperature intolerance or polydipsia.  Allergic/Immunologic: Negative for new environmental or food allergies.  Neurological: Negative for dizziness, seizures, speech difficulty, numbness.   Hematological: Negative for adenopathy.   Psychiatric/Behavioral: Negative for agitation and confusion.    EXAM    /71   Pulse 70   Temp 97.7 °F (36.5 °C)   Wt 68.9 kg (152 lb)   SpO2 98%   BMI 24.53 kg/m²     GEN: NAD   BREAST: Incisions well healed  Good contour  ABD: Good contour    IMP: 54 y.o.female s/p IBR  PLAN: Doing well overall.  She is happy with her results. Advised that she can have Tylenol/Motrin for discomfort. Discussed signs to watch and she will follow-up in 1 year for surveillance. Will have her MRI at her 5 year evaluation.     Johnny Sanabria MD  [facility] Plastic & Reconstructive Surgery  [phone]  12/16/24

## 2025-01-14 ENCOUNTER — TELEPHONE (OUTPATIENT)
Dept: SURGERY | Age: 55
End: 2025-01-14

## 2025-01-14 NOTE — TELEPHONE ENCOUNTER
I spoke with the patient at the home number listed. The patient is now scheduled to see Dr. Sanabria 3- 1 pm.    I will close this phone note.

## 2025-01-14 NOTE — TELEPHONE ENCOUNTER
[facility]    Spoke with patient - please have her return for evaluation in March.    Thanks,  NK

## 2025-01-14 NOTE — TELEPHONE ENCOUNTER
Patient called asking if she would be able to schedule a surgery to get her implants replaced this July, or if she has to wait longer?    Please f/u with patient [phone]

## (undated) DEVICE — PADDING CAST W20XL29.8IN FOAM SELF ADH M SUPP LTWT RESTON

## (undated) DEVICE — GAUZE FLUFF 1 PLY: Brand: DEROYAL

## (undated) DEVICE — 15' LARGE BORE TUBING W/SILICONE INSERT FOR INFILTRATION PUMP SYSTEMS -SINGLE SPIKE,  BOX OF 10: Brand: INFILTRATION TUBING

## (undated) DEVICE — BLADE,CARBON-STEEL,15,STRL,DISPOSABLE,TB: Brand: MEDLINE

## (undated) DEVICE — APPLICATOR MEDICATED 26 CC SOLUTION HI LT ORNG CHLORAPREP

## (undated) DEVICE — GLOVE SURG SZ 75 L12IN FNGR THK79MIL GRN LTX FREE

## (undated) DEVICE — 4MM SINGLE USE CANNULA, 10MM PORT, 22CM LONG, MERCEDES: Brand: MICROAIRE®

## (undated) DEVICE — 1LYRTR 16FR10ML100%SIL UMS SNP: Brand: MEDLINE INDUSTRIES, INC.

## (undated) DEVICE — ADHESIVE SKIN CLOSURE WND 8.661X1.5 IN 22 CM LIQUIBAND SECUR

## (undated) DEVICE — 3M™ IOBAN™ 2 ANTIMICROBIAL INCISE DRAPE 6648EZ: Brand: IOBAN™ 2

## (undated) DEVICE — PLASTIC MAJOR: Brand: MEDLINE INDUSTRIES, INC.

## (undated) DEVICE — SYRINGE IRRIG 60ML SFT PLIABLE BLB EZ TO GRP 1 HND USE W/

## (undated) DEVICE — 450 ML BOTTLE OF 0.05% CHLORHEXIDINE GLUCONATE IN 99.95% STERILE WATER FOR IRRIGATION, USP AND APPLICATOR.: Brand: IRRISEPT ANTIMICROBIAL WOUND LAVAGE

## (undated) DEVICE — BINDER ABD UNIV H9IN WAIST 30-45IN E SFT COT PREM 3 PNL

## (undated) DEVICE — BRA SURG L BGE MARENA FR SNAP CMFRT WR

## (undated) DEVICE — GLOVE ORANGE PI 7 1/2   MSG9075

## (undated) DEVICE — STERILE PVP: Brand: MEDLINE INDUSTRIES, INC.

## (undated) DEVICE — ASPIRATION TUBING SET, DISPOSABLE: Brand: MICROAIRE®

## (undated) DEVICE — TOWEL,STOP FLAG GOLD N-W: Brand: MEDLINE

## (undated) DEVICE — GLOVE ORANGE PI 8 1/2   MSG9085

## (undated) DEVICE — SYRINGE MED 10ML LUERLOCK TIP W/O SFTY DISP

## (undated) DEVICE — SYRINGE CATH TIP 50ML

## (undated) DEVICE — SYSTEM LIPO FAT PROC REVOLVE RV001] ALLERGAN USA INC]

## (undated) DEVICE — SUTURE PLN GUT SZ 5-0 L18IN ABSRB YELLOWISH TAN L13MM PC-1 1915G

## (undated) DEVICE — BLANKET WRM W40.2XL55.9IN IORT LO BODY + MISTRAL AIR

## (undated) DEVICE — SYSTEM IMPL DEL FOR BRST IMPL FUN (SEE COMMENT)